# Patient Record
Sex: MALE
[De-identification: names, ages, dates, MRNs, and addresses within clinical notes are randomized per-mention and may not be internally consistent; named-entity substitution may affect disease eponyms.]

---

## 2019-10-23 PROBLEM — Z00.00 ENCOUNTER FOR PREVENTIVE HEALTH EXAMINATION: Status: ACTIVE | Noted: 2019-10-23

## 2019-10-25 ENCOUNTER — APPOINTMENT (OUTPATIENT)
Dept: ORTHOPEDIC SURGERY | Facility: CLINIC | Age: 39
End: 2019-10-25
Payer: COMMERCIAL

## 2019-10-25 VITALS
SYSTOLIC BLOOD PRESSURE: 117 MMHG | DIASTOLIC BLOOD PRESSURE: 77 MMHG | HEART RATE: 71 BPM | WEIGHT: 180 LBS | HEIGHT: 72 IN | BODY MASS INDEX: 24.38 KG/M2

## 2019-10-25 DIAGNOSIS — G57.61 LESION OF PLANTAR NERVE, RIGHT LOWER LIMB: ICD-10-CM

## 2019-10-25 DIAGNOSIS — M79.672 PAIN IN RIGHT FOOT: ICD-10-CM

## 2019-10-25 DIAGNOSIS — G57.62 LESION OF PLANTAR NERVE, LEFT LOWER LIMB: ICD-10-CM

## 2019-10-25 DIAGNOSIS — Z78.9 OTHER SPECIFIED HEALTH STATUS: ICD-10-CM

## 2019-10-25 DIAGNOSIS — M79.671 PAIN IN RIGHT FOOT: ICD-10-CM

## 2019-10-25 DIAGNOSIS — G89.29 PAIN IN RIGHT FOOT: ICD-10-CM

## 2019-10-25 PROCEDURE — 99203 OFFICE O/P NEW LOW 30 MIN: CPT | Mod: 25

## 2019-10-25 PROCEDURE — 64455 NJX AA&/STRD PLTR COM DG NRV: CPT | Mod: RT

## 2019-10-25 PROCEDURE — 73630 X-RAY EXAM OF FOOT: CPT | Mod: LT

## 2019-10-25 RX ORDER — OMEPRAZOLE MAGNESIUM 20 MG/1
20 TABLET, DELAYED RELEASE ORAL
Refills: 0 | Status: ACTIVE | COMMUNITY

## 2019-10-25 NOTE — PROCEDURE
[de-identified] : Corticosteroid injection in the RIGHT third webspace for diagnosis of Gentile's neuroma. Risks and benefits were discussed.\par The foot was cleaned with Betadine and alcohol. Under sterile conditions 1.5 cc of one percent lidocaine and Depo-Medrol 40 mg were injected third web space around the nerve. He tolerated the injection well.

## 2019-10-25 NOTE — HISTORY OF PRESENT ILLNESS
[de-identified] : Vishnu is 38 years old. I had treated him about 12 years ago with a LEFT foot Gentile's neuroma excision. He was good for about a year but then started to get some pain back. Pain has been mild. He now is getting pain in the RIGHT foot that feels similar to what he had in the LEFT. Pain can be more severe, 8/10 intermittently usually with walking. It feels better with rest and some shoe inserts and worse when walking. He feels clicking.\par No swelling. No injury. He hasn't take medication for this and has not had any injections since before the surgery in 2007

## 2019-10-25 NOTE — PHYSICAL EXAM
[LE] : Sensory: Intact in bilateral lower extremities [DP] : dorsalis pedis 2+ and symmetric bilaterally [PT] : posterior tibial 2+ and symmetric bilaterally [Normal RLE] : Right Lower Extremity: No scars, rashes, lesions, ulcers, skin intact [Normal LLE] : Left Lower Extremity: No scars, rashes, lesions, ulcers, skin intact [Normal Touch] : sensation intact for touch [Normal] : Oriented to person, place, and time, insight and judgement were intact and the affect was normal [de-identified] : Bilateral feet \par Gait is not antalgic.\par The patient is able to walk on heels and toes and do a repetitive heel raise. He has pain walking on his toes on the RIGHT\par No Edema, ecchymoses, erythema.\par Ankle range of motion is with 7 degrees dorsiflexion and 0 degrees plantar flexion.\par Subtalar motion Intact without pain\par Hallux/ lesser MP joint range of motion Intact without pain with ROM.\par Tender mildly bilateral third web spaces. Well-healed incision LEFT third web space dorsally.\par Very positive Jorge's click third webspace bilaterally.\par No Deformity.\par Motor: 5/5 ATT, GS , EHL, PTT/inversion, peroneals/eversion.\par Normal neurovascular exam\par  [de-identified] : no respiratory distress [de-identified] : \par X-rays of bilateral feet weight-bearing 3 views taken today are unremarkable.

## 2019-10-25 NOTE — DISCUSSION/SUMMARY
[de-identified] : 38-year-old who had a LEFT Gentile's neuroma excision third web space in 2007 but has had some mild chronic discomfort in this area that really wasn't bothering him enough to come in for further evaluation comes in now with pain worse in his RIGHT footwhich also feels like a third webspace Gentile's neuroma. X-rays are negative. We tried a corticosteroid injection in the RIGHT. He should wear shoes with good support that he'll most comfortable. Warm soaks and ice and NSAIDs as needed. If in the next 3-4 weeks he hasn't gotten any significant relief then I would refer him for a diagnostic ultrasound of both feet to assess for the neuromas and he could try an injection in either foot if indicated.\par He has ongoing pain then certainly surgery for the neuroma could be done which would be excision. For recurrent neuroma than a plantar approach to excise the nerve further back Proximally could be done. At this time it doesn't seem to bother him enough that he would want surgery on the LEFT foot again.\par I will call him If and when I get any ultrasound results and he can followup.